# Patient Record
Sex: MALE | Race: ASIAN | URBAN - METROPOLITAN AREA
[De-identification: names, ages, dates, MRNs, and addresses within clinical notes are randomized per-mention and may not be internally consistent; named-entity substitution may affect disease eponyms.]

---

## 2021-03-30 ENCOUNTER — HOSPITAL ENCOUNTER (INPATIENT)
Facility: CLINIC | Age: 41
LOS: 7 days | Discharge: ANOTHER HOSPITAL | End: 2021-04-07
Attending: EMERGENCY MEDICINE | Admitting: PSYCHIATRY & NEUROLOGY
Payer: COMMERCIAL

## 2021-03-30 ENCOUNTER — TELEPHONE (OUTPATIENT)
Dept: BEHAVIORAL HEALTH | Facility: CLINIC | Age: 41
End: 2021-03-30

## 2021-03-30 DIAGNOSIS — E56.9 VITAMIN DEFICIENCY: ICD-10-CM

## 2021-03-30 DIAGNOSIS — F33.3 SEVERE RECURRENT MAJOR DEPRESSIVE DISORDER WITH PSYCHOTIC FEATURES (H): ICD-10-CM

## 2021-03-30 DIAGNOSIS — F51.01 PRIMARY INSOMNIA: ICD-10-CM

## 2021-03-30 DIAGNOSIS — F41.9 ANXIETY: ICD-10-CM

## 2021-03-30 DIAGNOSIS — M10.00 ACUTE IDIOPATHIC GOUT, UNSPECIFIED SITE: Primary | ICD-10-CM

## 2021-03-30 DIAGNOSIS — T71.162A SUICIDE ATTEMPT BY HANGING, INITIAL ENCOUNTER (H): ICD-10-CM

## 2021-03-30 DIAGNOSIS — Z11.52 ENCOUNTER FOR SCREENING LABORATORY TESTING FOR SEVERE ACUTE RESPIRATORY SYNDROME CORONAVIRUS 2 (SARS-COV-2): ICD-10-CM

## 2021-03-30 DIAGNOSIS — G43.019 INTRACTABLE MIGRAINE WITHOUT AURA AND WITHOUT STATUS MIGRAINOSUS: ICD-10-CM

## 2021-03-30 LAB
ALBUMIN SERPL-MCNC: 4.4 G/DL (ref 3.4–5)
ALP SERPL-CCNC: 62 U/L (ref 40–150)
ALT SERPL W P-5'-P-CCNC: 38 U/L (ref 0–70)
AMPHETAMINES UR QL SCN: NEGATIVE
ANION GAP SERPL CALCULATED.3IONS-SCNC: 11 MMOL/L (ref 3–14)
AST SERPL W P-5'-P-CCNC: 18 U/L (ref 0–45)
BARBITURATES UR QL: NEGATIVE
BASOPHILS # BLD AUTO: 0 10E9/L (ref 0–0.2)
BASOPHILS NFR BLD AUTO: 0.6 %
BENZODIAZ UR QL: NEGATIVE
BILIRUB SERPL-MCNC: 0.5 MG/DL (ref 0.2–1.3)
BUN SERPL-MCNC: 14 MG/DL (ref 7–30)
CALCIUM SERPL-MCNC: 9.2 MG/DL (ref 8.5–10.1)
CANNABINOIDS UR QL SCN: NEGATIVE
CHLORIDE SERPL-SCNC: 104 MMOL/L (ref 94–109)
CO2 SERPL-SCNC: 25 MMOL/L (ref 20–32)
COCAINE UR QL: NEGATIVE
CREAT SERPL-MCNC: 0.96 MG/DL (ref 0.66–1.25)
DIFFERENTIAL METHOD BLD: NORMAL
EOSINOPHIL # BLD AUTO: 0.1 10E9/L (ref 0–0.7)
EOSINOPHIL NFR BLD AUTO: 2 %
ERYTHROCYTE [DISTWIDTH] IN BLOOD BY AUTOMATED COUNT: 12.3 % (ref 10–15)
ETHANOL UR QL SCN: NEGATIVE
GFR SERPL CREATININE-BSD FRML MDRD: >90 ML/MIN/{1.73_M2}
GLUCOSE SERPL-MCNC: 108 MG/DL (ref 70–99)
HCT VFR BLD AUTO: 46.2 % (ref 40–53)
HGB BLD-MCNC: 16.3 G/DL (ref 13.3–17.7)
IMM GRANULOCYTES # BLD: 0 10E9/L (ref 0–0.4)
IMM GRANULOCYTES NFR BLD: 0.1 %
LABORATORY COMMENT REPORT: NORMAL
LYMPHOCYTES # BLD AUTO: 2.2 10E9/L (ref 0.8–5.3)
LYMPHOCYTES NFR BLD AUTO: 31 %
MCH RBC QN AUTO: 28.9 PG (ref 26.5–33)
MCHC RBC AUTO-ENTMCNC: 35.3 G/DL (ref 31.5–36.5)
MCV RBC AUTO: 82 FL (ref 78–100)
MONOCYTES # BLD AUTO: 0.3 10E9/L (ref 0–1.3)
MONOCYTES NFR BLD AUTO: 3.9 %
NEUTROPHILS # BLD AUTO: 4.3 10E9/L (ref 1.6–8.3)
NEUTROPHILS NFR BLD AUTO: 62.4 %
NRBC # BLD AUTO: 0 10*3/UL
NRBC BLD AUTO-RTO: 0 /100
OPIATES UR QL SCN: NEGATIVE
PLATELET # BLD AUTO: 216 10E9/L (ref 150–450)
POTASSIUM SERPL-SCNC: 3.4 MMOL/L (ref 3.4–5.3)
PROT SERPL-MCNC: 8.1 G/DL (ref 6.8–8.8)
RBC # BLD AUTO: 5.64 10E12/L (ref 4.4–5.9)
SARS-COV-2 RNA RESP QL NAA+PROBE: NEGATIVE
SODIUM SERPL-SCNC: 140 MMOL/L (ref 133–144)
SPECIMEN SOURCE: NORMAL
TSH SERPL DL<=0.005 MIU/L-ACNC: 1.93 MU/L (ref 0.4–4)
WBC # BLD AUTO: 6.9 10E9/L (ref 4–11)

## 2021-03-30 PROCEDURE — 85025 COMPLETE CBC W/AUTO DIFF WBC: CPT | Performed by: EMERGENCY MEDICINE

## 2021-03-30 PROCEDURE — 80320 DRUG SCREEN QUANTALCOHOLS: CPT | Performed by: EMERGENCY MEDICINE

## 2021-03-30 PROCEDURE — 99285 EMERGENCY DEPT VISIT HI MDM: CPT | Performed by: EMERGENCY MEDICINE

## 2021-03-30 PROCEDURE — 80053 COMPREHEN METABOLIC PANEL: CPT | Performed by: EMERGENCY MEDICINE

## 2021-03-30 PROCEDURE — 87635 SARS-COV-2 COVID-19 AMP PRB: CPT | Performed by: EMERGENCY MEDICINE

## 2021-03-30 PROCEDURE — C9803 HOPD COVID-19 SPEC COLLECT: HCPCS

## 2021-03-30 PROCEDURE — 250N000013 HC RX MED GY IP 250 OP 250 PS 637: Performed by: EMERGENCY MEDICINE

## 2021-03-30 PROCEDURE — 80307 DRUG TEST PRSMV CHEM ANLYZR: CPT | Performed by: EMERGENCY MEDICINE

## 2021-03-30 PROCEDURE — 99285 EMERGENCY DEPT VISIT HI MDM: CPT | Mod: 25

## 2021-03-30 PROCEDURE — 90791 PSYCH DIAGNOSTIC EVALUATION: CPT

## 2021-03-30 PROCEDURE — 84443 ASSAY THYROID STIM HORMONE: CPT | Performed by: EMERGENCY MEDICINE

## 2021-03-30 RX ORDER — ACETAMINOPHEN 500 MG
1000 TABLET ORAL ONCE
Status: COMPLETED | OUTPATIENT
Start: 2021-03-30 | End: 2021-03-30

## 2021-03-30 RX ORDER — DULOXETIN HYDROCHLORIDE 60 MG/1
60 CAPSULE, DELAYED RELEASE ORAL EVERY MORNING
Status: DISCONTINUED | OUTPATIENT
Start: 2021-03-31 | End: 2021-04-07 | Stop reason: HOSPADM

## 2021-03-30 RX ORDER — DULOXETIN HYDROCHLORIDE 60 MG/1
60 CAPSULE, DELAYED RELEASE ORAL EVERY MORNING
Status: ON HOLD | COMMUNITY
Start: 2021-03-13 | End: 2021-04-07

## 2021-03-30 RX ORDER — TRAZODONE HYDROCHLORIDE 100 MG/1
50 TABLET ORAL
Status: ON HOLD | COMMUNITY
Start: 2020-01-24 | End: 2021-04-07

## 2021-03-30 RX ORDER — PROPRANOLOL HYDROCHLORIDE 80 MG/1
80 CAPSULE, EXTENDED RELEASE ORAL DAILY
Status: DISCONTINUED | OUTPATIENT
Start: 2021-03-31 | End: 2021-04-07 | Stop reason: HOSPADM

## 2021-03-30 RX ORDER — TRAZODONE HYDROCHLORIDE 100 MG/1
100 TABLET ORAL AT BEDTIME
Status: DISCONTINUED | OUTPATIENT
Start: 2021-03-30 | End: 2021-03-31

## 2021-03-30 RX ADMIN — ACETAMINOPHEN 1000 MG: 500 TABLET, FILM COATED ORAL at 23:04

## 2021-03-30 ASSESSMENT — ENCOUNTER SYMPTOMS
ARTHRALGIAS: 0
NECK STIFFNESS: 0
HEADACHES: 0
COLOR CHANGE: 0
TROUBLE SWALLOWING: 0
SHORTNESS OF BREATH: 0
NAUSEA: 0
NECK PAIN: 0
SLEEP DISTURBANCE: 0
SORE THROAT: 0
VOMITING: 0
DYSPHORIC MOOD: 1
FEVER: 0
WEAKNESS: 1
BACK PAIN: 1
ABDOMINAL PAIN: 0
COUGH: 0
CONFUSION: 0
EYE REDNESS: 0
DIFFICULTY URINATING: 0
FACIAL SWELLING: 0

## 2021-03-30 NOTE — ED NOTES
Bed: HW01  Expected date: 3/30/21  Expected time: 5:33 PM  Means of arrival: Ambulance  Comments:  [place] [id number]----40 male, SI copoperative

## 2021-03-30 NOTE — TELEPHONE ENCOUNTER
"5:15p Jacquelyn  through [facility] called with colateral on her client.  phone number is [phone]. The pt's wife name is Ingrid Flores contact information is as follows [phone].     COPE called EMS to transport the pt to [facility] ER for SI. The pt attempted suicide last night. The pt attempted to hang himself. The pt has a hx of SI w/ a plan of hanging himself.     The pt identifies a potential trigger to his CM & COPE: the pt's  medical Hx of chronic pain and limited mobility in his leg due to a back injury at work previous and the pt got declined for social security disability roughly last week- the coincides with the pt's increased SI and depression.     The pt's wife stated to the CM that his behaviors have been outside the pt's norm for the last week. The pt has been experiencing increased anger and suicidal statements. The wife stated \"he has been so angry I am afraid to drive him to the ER myself\".     The pt does have a therapist and psychiatrist through Joyce Smith (theripist) & Dr. Ray Sánchez (Psychitrist).     Pt has a medical Hx of chronic pain and limited mobility in his leg due to a back injury at work previous and gout.     Pt has a MH Hx of  MDD & PTSD.     The pt has the following medication Rx: trazodone, propanonol, duloxepine.     "

## 2021-03-30 NOTE — ED PROVIDER NOTES
[facility] EMERGENCY DEPARTMENT ([facility])  3/30/21      History     Chief Complaint   Patient presents with     Suicide Attempt     tried to hang himself yesterday.  Spouse      The history is provided by the patient and medical records.     Kari Flores is a 41 year old male who is to the emergency department for evaluation of suicidal ideation and attempt.  The patient states that he attempted to hang himself yesterday.  He states that he wrapped a rope around his neck and then the door handle of the bathroom door.  He states he sat down and leaned backwards but ultimately never lost consciousness.  He states he ultimately untied himself.  He denies any difficulty breathing or swallowing today.  He states that he subsequently called [place] last night and  was referred to the emergency department by his mental health  today.  Patient denies any recent fall or injury.  He endorses ongoing depression here in the emergency department and is not able to contract for safety.  He denies any recent illness.  No fever, cough, or dyspnea.  Denies abdominal pain.  He does use a cane for chronic back pain and weakness in his left leg subsequent to an injury.  He states that that is stable and unchanged.    Past Medical History  No past medical history on file.  No past surgical history on file.  DULoxetine (CYMBALTA) 60 MG capsule  propranolol SR BEADS (INDREAL XL) 80 MG 24 hr capsule  traZODone (DESYREL) 100 MG tablet      No Known Allergies  Family History  No family history on file.  Social History   Social History     Tobacco Use     Smoking status: Not on file   Substance Use Topics     Alcohol use: Not on file     Drug use: Not on file      Past medical history, past surgical history, medications, allergies, family history, and social history were reviewed with the patient. No additional pertinent items.       Review of Systems   Constitutional: Negative for fever.   HENT: Negative for congestion,  facial swelling, sore throat and trouble swallowing.    Eyes: Negative for redness.   Respiratory: Negative for cough and shortness of breath.    Cardiovascular: Negative for chest pain.   Gastrointestinal: Negative for abdominal pain, nausea and vomiting.   Genitourinary: Negative for difficulty urinating.   Musculoskeletal: Positive for back pain (chronic and unchanged). Negative for arthralgias, neck pain and neck stiffness.   Skin: Negative for color change and rash.   Neurological: Positive for weakness (Left leg-chronic and unchanged). Negative for headaches.   Psychiatric/Behavioral: Positive for dysphoric mood and suicidal ideas. Negative for confusion and sleep disturbance.   All other systems reviewed and are negative.    A complete review of systems was performed with pertinent positives and negatives noted in the HPI, and all other systems negative.    Physical Exam   BP: (!) 136/92  Pulse: 67  Temp: 98.1  F (36.7  C)  Resp: 16  SpO2: 99 %  Physical Exam  Vitals signs and nursing note reviewed.   Constitutional:       General: He is not in acute distress.     Appearance: He is not diaphoretic.   HENT:      Head: Normocephalic and atraumatic.      Mouth/Throat:      Mouth: Mucous membranes are moist.   Eyes:      General: No scleral icterus.     Pupils: Pupils are equal, round, and reactive to light.      Comments: No petechiae   Neck:      Musculoskeletal: Normal range of motion.   Cardiovascular:      Rate and Rhythm: Normal rate and regular rhythm.      Pulses: Normal pulses.      Heart sounds: Normal heart sounds.   Pulmonary:      Effort: Pulmonary effort is normal. No respiratory distress.      Breath sounds: Normal breath sounds.   Chest:      Chest wall: No tenderness.   Abdominal:      General: Bowel sounds are normal.      Palpations: Abdomen is soft.      Tenderness: There is no abdominal tenderness.   Musculoskeletal:         General: No tenderness.      Cervical back: He exhibits no  tenderness.      Thoracic back: He exhibits no tenderness.      Lumbar back: He exhibits decreased range of motion. He exhibits no tenderness.   Skin:     General: Skin is warm.      Findings: No rash.   Neurological:      Mental Status: He is oriented to person, place, and time.      Coordination: Coordination normal.      Gait: Gait abnormal (Steppage pattern left lower extremity).   Psychiatric:         Mood and Affect: Mood is depressed. Affect is blunt.         Speech: Speech is delayed.         Behavior: Behavior is withdrawn. Behavior is cooperative.         Thought Content: Thought content includes suicidal ideation.         ED Course      Procedures        The medical record was reviewed and interpreted.  Current labs reviewed and interpreted.  Managed outpatient prescription medications.   Results for orders placed or performed during the hospital encounter of 03/30/21   Drug abuse screen 6 urine (chem dep)     Status: None   Result Value Ref Range    Amphetamine Qual Urine Negative NEG^Negative    Barbiturates Qual Urine Negative NEG^Negative    Benzodiazepine Qual Urine Negative NEG^Negative    Cannabinoids Qual Urine Negative NEG^Negative    Cocaine Qual Urine Negative NEG^Negative    Ethanol Qual Urine Negative NEG^Negative    Opiates Qualitative Urine Negative NEG^Negative   CBC with platelets differential     Status: None   Result Value Ref Range    WBC 6.9 4.0 - 11.0 10e9/L    RBC Count 5.64 4.4 - 5.9 10e12/L    Hemoglobin 16.3 13.3 - 17.7 g/dL    Hematocrit 46.2 40.0 - 53.0 %    MCV 82 78 - 100 fl    MCH 28.9 26.5 - 33.0 pg    MCHC 35.3 31.5 - 36.5 g/dL    RDW 12.3 10.0 - 15.0 %    Platelet Count 216 150 - 450 10e9/L    Diff Method Automated Method     % Neutrophils 62.4 %    % Lymphocytes 31.0 %    % Monocytes 3.9 %    % Eosinophils 2.0 %    % Basophils 0.6 %    % Immature Granulocytes 0.1 %    Nucleated RBCs 0 0 /100    Absolute Neutrophil 4.3 1.6 - 8.3 10e9/L    Absolute Lymphocytes 2.2 0.8 -  5.3 10e9/L    Absolute Monocytes 0.3 0.0 - 1.3 10e9/L    Absolute Eosinophils 0.1 0.0 - 0.7 10e9/L    Absolute Basophils 0.0 0.0 - 0.2 10e9/L    Abs Immature Granulocytes 0.0 0 - 0.4 10e9/L    Absolute Nucleated RBC 0.0    Comprehensive metabolic panel     Status: Abnormal   Result Value Ref Range    Sodium 140 133 - 144 mmol/L    Potassium 3.4 3.4 - 5.3 mmol/L    Chloride 104 94 - 109 mmol/L    Carbon Dioxide 25 20 - 32 mmol/L    Anion Gap 11 3 - 14 mmol/L    Glucose 108 (H) 70 - 99 mg/dL    Urea Nitrogen 14 7 - 30 mg/dL    Creatinine 0.96 0.66 - 1.25 mg/dL    GFR Estimate >90 >60 mL/min/[1.73_m2]    GFR Estimate If Black >90 >60 mL/min/[1.73_m2]    Calcium 9.2 8.5 - 10.1 mg/dL    Bilirubin Total 0.5 0.2 - 1.3 mg/dL    Albumin 4.4 3.4 - 5.0 g/dL    Protein Total 8.1 6.8 - 8.8 g/dL    Alkaline Phosphatase 62 40 - 150 U/L    ALT 38 0 - 70 U/L    AST 18 0 - 45 U/L   TSH with free T4 reflex     Status: None   Result Value Ref Range    TSH 1.93 0.40 - 4.00 mU/L   Asymptomatic SARS-CoV-2 COVID-19 Virus (Coronavirus) by PCR     Status: None    Specimen: Nasopharyngeal   Result Value Ref Range    SARS-CoV-2 Virus Specimen Source Nasopharyngeal     SARS-CoV-2 PCR Result NEGATIVE     SARS-CoV-2 PCR Comment (Note)         Patient evaluated by DEC .  Patient discussed with DEC consultant.  See note for complete details.      Results for orders placed or performed during the hospital encounter of 03/30/21   Drug abuse screen 6 urine (chem dep)     Status: None   Result Value Ref Range    Amphetamine Qual Urine Negative NEG^Negative    Barbiturates Qual Urine Negative NEG^Negative    Benzodiazepine Qual Urine Negative NEG^Negative    Cannabinoids Qual Urine Negative NEG^Negative    Cocaine Qual Urine Negative NEG^Negative    Ethanol Qual Urine Negative NEG^Negative    Opiates Qualitative Urine Negative NEG^Negative   CBC with platelets differential     Status: None   Result Value Ref Range    WBC 6.9 4.0 - 11.0 10e9/L     RBC Count 5.64 4.4 - 5.9 10e12/L    Hemoglobin 16.3 13.3 - 17.7 g/dL    Hematocrit 46.2 40.0 - 53.0 %    MCV 82 78 - 100 fl    MCH 28.9 26.5 - 33.0 pg    MCHC 35.3 31.5 - 36.5 g/dL    RDW 12.3 10.0 - 15.0 %    Platelet Count 216 150 - 450 10e9/L    Diff Method Automated Method     % Neutrophils 62.4 %    % Lymphocytes 31.0 %    % Monocytes 3.9 %    % Eosinophils 2.0 %    % Basophils 0.6 %    % Immature Granulocytes 0.1 %    Nucleated RBCs 0 0 /100    Absolute Neutrophil 4.3 1.6 - 8.3 10e9/L    Absolute Lymphocytes 2.2 0.8 - 5.3 10e9/L    Absolute Monocytes 0.3 0.0 - 1.3 10e9/L    Absolute Eosinophils 0.1 0.0 - 0.7 10e9/L    Absolute Basophils 0.0 0.0 - 0.2 10e9/L    Abs Immature Granulocytes 0.0 0 - 0.4 10e9/L    Absolute Nucleated RBC 0.0    Comprehensive metabolic panel     Status: Abnormal   Result Value Ref Range    Sodium 140 133 - 144 mmol/L    Potassium 3.4 3.4 - 5.3 mmol/L    Chloride 104 94 - 109 mmol/L    Carbon Dioxide 25 20 - 32 mmol/L    Anion Gap 11 3 - 14 mmol/L    Glucose 108 (H) 70 - 99 mg/dL    Urea Nitrogen 14 7 - 30 mg/dL    Creatinine 0.96 0.66 - 1.25 mg/dL    GFR Estimate >90 >60 mL/min/[1.73_m2]    GFR Estimate If Black >90 >60 mL/min/[1.73_m2]    Calcium 9.2 8.5 - 10.1 mg/dL    Bilirubin Total 0.5 0.2 - 1.3 mg/dL    Albumin 4.4 3.4 - 5.0 g/dL    Protein Total 8.1 6.8 - 8.8 g/dL    Alkaline Phosphatase 62 40 - 150 U/L    ALT 38 0 - 70 U/L    AST 18 0 - 45 U/L   TSH with free T4 reflex     Status: None   Result Value Ref Range    TSH 1.93 0.40 - 4.00 mU/L   Asymptomatic SARS-CoV-2 COVID-19 Virus (Coronavirus) by PCR     Status: None    Specimen: Nasopharyngeal   Result Value Ref Range    SARS-CoV-2 Virus Specimen Source Nasopharyngeal     SARS-CoV-2 PCR Result NEGATIVE     SARS-CoV-2 PCR Comment (Note)      Medications   DULoxetine (CYMBALTA) DR capsule 60 mg (has no administration in time range)   propranolol SR BEADS (INDREAL XL) CP24 80 mg (has no administration in time range)   traZODone  (DESYREL) tablet 100 mg (has no administration in time range)   acetaminophen (TYLENOL) tablet 1,000 mg (1,000 mg Oral Given 3/30/21 7301)        Assessments & Plan (with Medical Decision Making)   41 year old male with history of depression to the emergency department after an attempted hanging yesterday.  He tried to hang himself from a doorknob with a rope that he got off of some draperies in his home.  He is unable to contract for safety here in the emergency department.  He appears significantly depressed and withdrawn.  He has undergone BEC assessment and admission has been recommended.  The patient is voluntary and agreeable with this plan but holdable if he changes his mind.  He does not have any medical concerns.  He does not have any Covid symptoms or known exposures.  The patient appears medically stable for psychiatric admission.    I have reviewed the nursing notes. I have reviewed the findings, diagnosis, plan and need for follow up with the patient.    New Prescriptions    No medications on file       Final diagnoses:   Suicide attempt by hanging, initial encounter (H)     Chart documentation was completed with Dragon voice-recognition software. Even though reviewed, this chart may still contain some grammatical, spelling, and word errors.     --DEJON WILCOX MD, MD     [facility] EMERGENCY DEPARTMENT  3/30/2021     Dejon Wilcox MD  03/30/21 8837

## 2021-03-31 PROBLEM — T71.162A SUICIDE ATTEMPT BY HANGING, INITIAL ENCOUNTER (H): Status: ACTIVE | Noted: 2021-03-31

## 2021-03-31 PROCEDURE — 124N000002 HC R&B MH UMMC

## 2021-03-31 PROCEDURE — 250N000013 HC RX MED GY IP 250 OP 250 PS 637: Performed by: EMERGENCY MEDICINE

## 2021-03-31 RX ORDER — ACETAMINOPHEN 325 MG/1
650 TABLET ORAL EVERY 4 HOURS PRN
Status: DISCONTINUED | OUTPATIENT
Start: 2021-03-31 | End: 2021-04-07 | Stop reason: HOSPADM

## 2021-03-31 RX ORDER — MAGNESIUM HYDROXIDE/ALUMINUM HYDROXICE/SIMETHICONE 120; 1200; 1200 MG/30ML; MG/30ML; MG/30ML
30 SUSPENSION ORAL EVERY 4 HOURS PRN
Status: DISCONTINUED | OUTPATIENT
Start: 2021-03-31 | End: 2021-04-07 | Stop reason: HOSPADM

## 2021-03-31 RX ORDER — TRAZODONE HYDROCHLORIDE 50 MG/1
50 TABLET, FILM COATED ORAL
Status: DISCONTINUED | OUTPATIENT
Start: 2021-03-31 | End: 2021-03-31

## 2021-03-31 RX ORDER — HYDROXYZINE HYDROCHLORIDE 25 MG/1
25 TABLET, FILM COATED ORAL EVERY 4 HOURS PRN
Status: DISCONTINUED | OUTPATIENT
Start: 2021-03-31 | End: 2021-04-07 | Stop reason: HOSPADM

## 2021-03-31 RX ORDER — OLANZAPINE 10 MG/2ML
5-10 INJECTION, POWDER, FOR SOLUTION INTRAMUSCULAR 3 TIMES DAILY PRN
Status: DISCONTINUED | OUTPATIENT
Start: 2021-03-31 | End: 2021-04-07 | Stop reason: HOSPADM

## 2021-03-31 RX ORDER — OLANZAPINE 5 MG/1
5-10 TABLET ORAL 3 TIMES DAILY PRN
Status: DISCONTINUED | OUTPATIENT
Start: 2021-03-31 | End: 2021-04-07 | Stop reason: HOSPADM

## 2021-03-31 RX ORDER — LANOLIN ALCOHOL/MO/W.PET/CERES
3 CREAM (GRAM) TOPICAL
Status: DISCONTINUED | OUTPATIENT
Start: 2021-03-31 | End: 2021-04-07 | Stop reason: HOSPADM

## 2021-03-31 RX ADMIN — PROPRANOLOL HYDROCHLORIDE 80 MG: 80 CAPSULE, EXTENDED RELEASE ORAL at 07:55

## 2021-03-31 RX ADMIN — DULOXETINE HYDROCHLORIDE 60 MG: 60 CAPSULE, DELAYED RELEASE ORAL at 07:55

## 2021-03-31 ASSESSMENT — ACTIVITIES OF DAILY LIVING (ADL)
DRESS: INDEPENDENT
HYGIENE/GROOMING: INDEPENDENT
LAUNDRY: WITH SUPERVISION
ORAL_HYGIENE: INDEPENDENT

## 2021-03-31 ASSESSMENT — MIFFLIN-ST. JEOR: SCORE: 1575.33

## 2021-03-31 NOTE — PLAN OF CARE
"            Work Completed: [facility] reviewed pt's chart and DEC assessment to gather collateral information. [facility] met with pt to introduce themselves and explain their role on the treatment team. [facility] tried to engage pt about the IPA interview, however pt was not able to constructively participate. Pt responded to most questions with basic yes and no responses, or \"I don't know\" or \"I am not sure\". Pt was cooperative and appropriate in his responses, but presented as very depressed and possibly had a hard time processing what [facility] was saying. Pt declined wanting or needing an  when asked/offered. Pt did request to be shown how to flush the toilet in the room, and [facility] showed him the button on the side that engaged the flushing system. Pt declined needing [facility] to contact anyone about his current stay/placement. [facility] started IPA via chart review and will attempt to interview pt again tomorrow to complete.     Discharge plan or goal: TBD upon evaluation and assessment by treatment team.                Barriers to discharge: Safe discharge plan, ongoing symptoms severity (active SI and Depression), and medication evaluation.    "

## 2021-03-31 NOTE — ED NOTES
Patient was able to get out from bed indeopendently using his quad cane. Steady on feet when ambulating, able to go to bathroom, washed his  hands by himself and went back to room independently.
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
## 2021-03-31 NOTE — TELEPHONE ENCOUNTER
S: Pt is a 41 yrs old male in the [place] ED for post SA, reports by William at 7:38PM.     B:  Pt had a SA yesterday by trying to hang himself with a rope.  Pt reports he has been suicidal for the past 2-3 weeks. He has lots of trauma and medical concerns.  Pt had a SA 2 years ago. Family had safety proof the house some what and removed all ropes.  Pt used the rope that ties the window curtain.  Pt reports no substance use. No HI.  Pt describes that he has audio hallucinations.  At night he hears voices; he hears a person yelling and thinks the person will come to his house to harm him.   stated that it is trauma related.  Pt has a hx of SIB by hitting self and pulling his hair.  Pt is depressed and withdrawn.  He cannot provide details of what his meds are.  Said he is compliant.  Has services in place (, psychiatrist, and therapist).  Pt lives with family. Pt was declined for SSA last week which led to his depression and SI.  He has a leg and back injury from work and uses cane to walk.    Pt has no symptoms of COVID.  He is medically cleared and ambulates with a cane.    CBC: normal  COVID: pending  UTOX: pending  CMP: Glucose 108  Vitals: stable    A: Vol.  Holdable.  Pt is actively suicidal.  Cannot contract for safety.  Said that it is best he dies and thinks hanging self is best.      R: 9:35PM- Dr. Phan accepts for 10/Glenn.        Patient cleared and ready for behavioral bed placement: Yes

## 2021-03-31 NOTE — PROGRESS NOTES
.               IN PATIENT ROOM:   Pt has glasses in his room.    IN PATIENT LOCKER:   Pt has a cane, black pants with strings, grey shorts, white shirt, grey sweatshirt, crocs, and a [place] 's license are in the locker. Pt does not have a cell phone or wallet at this time.    IN SECURITY:   Pt has nothing in security time.    ADMISSION:  I am responsible for any personal items that are not sent to the safe or pharmacy. [facility] is not responsible for loss, theft or damage of any property in my possession.    Patient Signature _____________________ Date/Time _____________________    Staff Signature _______________________ Date/Time _____________________  2nd Staff person, if patient is unable/unwilling to sign  ___________________________________ Date/Time _____________________  DISCHARGE:  All personal items have been returned to me.    Patient Signature _____________________ Date/Time _____________________    Staff Signature _______________________ Date/Time _____________________
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
## 2021-03-31 NOTE — ED NOTES
Patient is boarding in ER.  Given new linens.  Given new scrubs and clothing items.  Given items to distract.  Engaged in conversation with patient.

## 2021-03-31 NOTE — ED NOTES
[facility] [place] ED Mental Health Handoff Note:       Brief HPI:  This is a 41 year old male signed out to me by Dr. Salguero.  See initial ED Provider note for full details of the presentation. Interval history is pertinent for suicide attempt by hanging, no signs trauma. Holdable, voluntary now.    Home meds reviewed and ordered/administered: Yes    Medically stable for inpatient mental health admission: Yes.    Evaluated by mental health: Yes. The recommendation is for inpatient mental health treatment. Bed search in process    Safety concerns: At the time I received sign out, there were no safety concerns.    Hold Status:  Active Orders   N/A            Exam:   Patient Vitals for the past 24 hrs:   BP Temp Temp src Pulse Resp SpO2   03/30/21 1745 (!) 136/92 98.1  F (36.7  C) Oral 67 16 99 %           ED Course:    Medications   DULoxetine (CYMBALTA) DR capsule 60 mg (has no administration in time range)   propranolol SR BEADS (INDREAL XL) CP24 80 mg (has no administration in time range)   traZODone (DESYREL) tablet 100 mg (100 mg Oral Not Given 3/31/21 0020)   acetaminophen (TYLENOL) tablet 1,000 mg (1,000 mg Oral Given 3/30/21 2304)            There were no significant events during my shift.    Patient was signed out to the oncoming provider      Impression:    ICD-10-CM    1. Suicide attempt by hanging, initial encounter (H)  T71.162A Drug abuse screen 6 urine (chem dep)     CBC with platelets differential     Comprehensive metabolic panel     TSH with free T4 reflex     Asymptomatic SARS-CoV-2 COVID-19 Virus (Coronavirus) by PCR       Plan:    1. Awaiting inpatient mental health admission/transfer.      RESULTS:   Results for orders placed or performed during the hospital encounter of 03/30/21 (from the past 24 hour(s))   CBC with platelets differential     Status: None    Collection Time: 03/30/21  8:37 PM   Result Value Ref Range    WBC 6.9 4.0 - 11.0 10e9/L    RBC Count 5.64 4.4 - 5.9 10e12/L    Hemoglobin  16.3 13.3 - 17.7 g/dL    Hematocrit 46.2 40.0 - 53.0 %    MCV 82 78 - 100 fl    MCH 28.9 26.5 - 33.0 pg    MCHC 35.3 31.5 - 36.5 g/dL    RDW 12.3 10.0 - 15.0 %    Platelet Count 216 150 - 450 10e9/L    Diff Method Automated Method     % Neutrophils 62.4 %    % Lymphocytes 31.0 %    % Monocytes 3.9 %    % Eosinophils 2.0 %    % Basophils 0.6 %    % Immature Granulocytes 0.1 %    Nucleated RBCs 0 0 /100    Absolute Neutrophil 4.3 1.6 - 8.3 10e9/L    Absolute Lymphocytes 2.2 0.8 - 5.3 10e9/L    Absolute Monocytes 0.3 0.0 - 1.3 10e9/L    Absolute Eosinophils 0.1 0.0 - 0.7 10e9/L    Absolute Basophils 0.0 0.0 - 0.2 10e9/L    Abs Immature Granulocytes 0.0 0 - 0.4 10e9/L    Absolute Nucleated RBC 0.0    Comprehensive metabolic panel     Status: Abnormal    Collection Time: 03/30/21  8:37 PM   Result Value Ref Range    Sodium 140 133 - 144 mmol/L    Potassium 3.4 3.4 - 5.3 mmol/L    Chloride 104 94 - 109 mmol/L    Carbon Dioxide 25 20 - 32 mmol/L    Anion Gap 11 3 - 14 mmol/L    Glucose 108 (H) 70 - 99 mg/dL    Urea Nitrogen 14 7 - 30 mg/dL    Creatinine 0.96 0.66 - 1.25 mg/dL    GFR Estimate >90 >60 mL/min/[1.73_m2]    GFR Estimate If Black >90 >60 mL/min/[1.73_m2]    Calcium 9.2 8.5 - 10.1 mg/dL    Bilirubin Total 0.5 0.2 - 1.3 mg/dL    Albumin 4.4 3.4 - 5.0 g/dL    Protein Total 8.1 6.8 - 8.8 g/dL    Alkaline Phosphatase 62 40 - 150 U/L    ALT 38 0 - 70 U/L    AST 18 0 - 45 U/L   TSH with free T4 reflex     Status: None    Collection Time: 03/30/21  8:37 PM   Result Value Ref Range    TSH 1.93 0.40 - 4.00 mU/L   Asymptomatic SARS-CoV-2 COVID-19 Virus (Coronavirus) by PCR     Status: None    Collection Time: 03/30/21  9:03 PM    Specimen: Nasopharyngeal   Result Value Ref Range    SARS-CoV-2 Virus Specimen Source Nasopharyngeal     SARS-CoV-2 PCR Result NEGATIVE     SARS-CoV-2 PCR Comment (Note)    Drug abuse screen 6 urine (chem dep)     Status: None    Collection Time: 03/30/21  9:51 PM   Result Value Ref Range     Amphetamine Qual Urine Negative NEG^Negative    Barbiturates Qual Urine Negative NEG^Negative    Benzodiazepine Qual Urine Negative NEG^Negative    Cannabinoids Qual Urine Negative NEG^Negative    Cocaine Qual Urine Negative NEG^Negative    Ethanol Qual Urine Negative NEG^Negative    Opiates Qualitative Urine Negative NEG^Negative             MD Elie Stein, Nj Gutierrez MD  03/31/21 0117

## 2021-03-31 NOTE — PROGRESS NOTES
Kari Flores is reviewed for [facility] service. Will follow and meet with patient/family/care team as able or requested.     Spoke with Leilani @ Central Intake. Patient accepted to station 10.   Intake presented to station 10 on 03/30 and patient was originally declined because it was believed that patient needed assistance ambulating and with ADL's. Per chart review, intake called station 10 charge and updated that patient is independent with all ADL's and does not need assistance with transfers, repositioning or toileting. He does use a can but is independent with this use.     Kia Samson, [facility], [facility]   [phone]

## 2021-03-31 NOTE — ED NOTES
.  ED to Behavioral Floor Handoff    SITUATION  Kari Flores is a 41 year old male who speaks English and lives in a home with a spouse The patient arrived in the ED by ambulance from home with a complaint of Suicide Attempt (tried to hang himself yesterday.  Spouse )  .The patient's current symptoms started/worsened 1 month(s) ago and during this time the symptoms have increased.   In the ED, pt was diagnosed with   Final diagnoses:   Suicide attempt by hanging, initial encounter (H)        Initial vitals were: BP: (!) 136/92  Pulse: 67  Temp: 98.1  F (36.7  C)  Resp: 16  SpO2: 99 %   --------  Is the patient diabetic? No   If yes, last blood glucose? --     If yes, was this treated in the ED? --  --------  Is the patient inebriated (ETOH) No or Impaired on other substances? No  MSSA done? N/A  Last MSSA score: --    Were withdrawal symptoms treated? N/A  Does the patient have a seizure history? No. If yes, date of most recent seizure--  --------  Is the patient patient experiencing suicidal ideation? has a history of suicidal attempts, most recent yesterday he tried to hang himself and today he got another rope to try again    Homicidal ideation? denies current or recent homicidal ideation or behaviors.    Self-injurious behavior/urges? denies current or recent self injurious behavior or ideation.  ------  Was pt aggressive in the ED No  Was a code called No  Is the pt now cooperative? Yes  -------  Meds given in ED: Medications - No data to display   Family present during ED course? No  Family currently present? No  Daughter is Radha and her ph is [phone] and she helps with interpreting for her mom.  Ingrid Flores, spouse, contact information is as follows [phone].        BACKGROUND  Does the patient have a cognitive impairment or developmental disability? No  Allergies: No Known Allergies.   Social demographics are   Social History     Socioeconomic History     Marital status:      Spouse name: Not  on file     Number of children: Not on file     Years of education: Not on file     Highest education level: Not on file   Occupational History     Not on file   Social Needs     Financial resource strain: Not on file     Food insecurity     Worry: Not on file     Inability: Not on file     Transportation needs     Medical: Not on file     Non-medical: Not on file   Tobacco Use     Smoking status: Not on file   Substance and Sexual Activity     Alcohol use: Not on file     Drug use: Not on file     Sexual activity: Not on file   Lifestyle     Physical activity     Days per week: Not on file     Minutes per session: Not on file     Stress: Not on file   Relationships     Social connections     Talks on phone: Not on file     Gets together: Not on file     Attends Cheondo[employer] service: Not on file     Active member of club or organization: Not on file     Attends meetings of clubs or organizations: Not on file     Relationship status: Not on file     Intimate partner violence     Fear of current or ex partner: Not on file     Emotionally abused: Not on file     Physically abused: Not on file     Forced sexual activity: Not on file   Other Topics Concern     Not on file   Social History Narrative     Not on file        ASSESSMENT  Labs results   Labs Ordered and Resulted from Time of ED Arrival Up to the Time of Departure from the ED   COMPREHENSIVE METABOLIC PANEL - Abnormal; Notable for the following components:       Result Value    Glucose 108 (*)     All other components within normal limits   CBC WITH PLATELETS DIFFERENTIAL   TSH WITH FREE T4 REFLEX   SARS-COV-2 (COVID-19) VIRUS RT-PCR   DRUG ABUSE SCREEN 6 CHEM DEP URINE ([facility])      Imaging Studies: No results found for this or any previous visit (from the past 24 hour(s)).   Most recent vital signs BP (!) 136/92   Pulse 67   Temp 98.1  F (36.7  C) (Oral)   Resp 16   SpO2 99%    Abnormal labs/tests/findings requiring intervention:---   Pain control:  good  Nausea control: pt had none    RECOMMENDATION  Are any infection precautions needed (MRSA, VRE, etc.)? No If yes, what infection? --  ---  Does the patient have mobility issues? with one assist. If yes, what device does the pt use?  QUAD CANE---  -Patient needs assist with repositioning in bed  -SBA with transfers  -and showering  -Assist with dressing  -Needs grab bar for toileting   -Needs help with wiping after BM  Doesn't like [employer] - okay with eggs, rice, chicken tenders, warm water    Patient watched admit Video.    Please call his wife when he gets admitted to let her know unit and phone number.    Is patient on 72 hour hold or commitment? No If on 72 hour hold, have hold and rights been given to patient? N/A  Are admitting orders written if after 10 p.m. ?N/A  Tasks needing to be completed:---     Lissette Peña RN   [facility]--    [phone] [place] [facility]   [phone] [facility]
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
## 2021-04-01 PROCEDURE — 124N000002 HC R&B MH UMMC

## 2021-04-01 PROCEDURE — 99222 1ST HOSP IP/OBS MODERATE 55: CPT | Mod: AI | Performed by: PSYCHIATRY & NEUROLOGY

## 2021-04-01 PROCEDURE — 250N000013 HC RX MED GY IP 250 OP 250 PS 637: Performed by: PSYCHIATRY & NEUROLOGY

## 2021-04-01 PROCEDURE — 250N000013 HC RX MED GY IP 250 OP 250 PS 637: Performed by: EMERGENCY MEDICINE

## 2021-04-01 RX ORDER — FEBUXOSTAT 80 MG/1
80 TABLET, FILM COATED ORAL DAILY PRN
Status: ON HOLD | COMMUNITY
End: 2021-04-07

## 2021-04-01 RX ORDER — ERGOCALCIFEROL 1.25 MG/1
50000 CAPSULE, LIQUID FILLED ORAL
Status: ON HOLD | COMMUNITY
End: 2021-04-07

## 2021-04-01 RX ORDER — COLCHICINE 0.6 MG/1
0.6 TABLET ORAL 2 TIMES DAILY PRN
Status: ON HOLD | COMMUNITY
End: 2021-04-07

## 2021-04-01 RX ORDER — FEBUXOSTAT 40 MG/1
80 TABLET, FILM COATED ORAL DAILY PRN
Status: DISCONTINUED | OUTPATIENT
Start: 2021-04-01 | End: 2021-04-07 | Stop reason: HOSPADM

## 2021-04-01 RX ORDER — PROPRANOLOL HYDROCHLORIDE 80 MG/1
80 CAPSULE, EXTENDED RELEASE ORAL DAILY
Status: ON HOLD | COMMUNITY
End: 2021-04-07

## 2021-04-01 RX ORDER — COLCHICINE 0.6 MG/1
0.6 TABLET ORAL 2 TIMES DAILY PRN
Status: DISCONTINUED | OUTPATIENT
Start: 2021-04-01 | End: 2021-04-07 | Stop reason: HOSPADM

## 2021-04-01 RX ORDER — ERGOCALCIFEROL 1.25 MG/1
50000 CAPSULE, LIQUID FILLED ORAL
Status: DISCONTINUED | OUTPATIENT
Start: 2021-04-01 | End: 2021-04-07 | Stop reason: HOSPADM

## 2021-04-01 RX ORDER — SUMATRIPTAN 25 MG/1
50 TABLET, FILM COATED ORAL
Status: DISCONTINUED | OUTPATIENT
Start: 2021-04-01 | End: 2021-04-07 | Stop reason: HOSPADM

## 2021-04-01 RX ORDER — SUMATRIPTAN 50 MG/1
50 TABLET, FILM COATED ORAL
Status: ON HOLD | COMMUNITY
End: 2021-04-07

## 2021-04-01 RX ORDER — BUPROPION HYDROCHLORIDE 150 MG/1
150 TABLET ORAL DAILY
Status: DISCONTINUED | OUTPATIENT
Start: 2021-04-01 | End: 2021-04-05

## 2021-04-01 RX ORDER — PREDNISONE 5 MG/1
TABLET ORAL
Status: ON HOLD | COMMUNITY
End: 2021-04-07

## 2021-04-01 RX ORDER — PREDNISONE 5 MG/1
5 TABLET ORAL
Status: DISCONTINUED | OUTPATIENT
Start: 2021-04-01 | End: 2021-04-01 | Stop reason: CLARIF

## 2021-04-01 RX ORDER — PROPRANOLOL HYDROCHLORIDE 80 MG/1
80 CAPSULE, EXTENDED RELEASE ORAL DAILY
Status: DISCONTINUED | OUTPATIENT
Start: 2021-04-01 | End: 2021-04-01

## 2021-04-01 RX ADMIN — PROPRANOLOL HYDROCHLORIDE 80 MG: 80 CAPSULE, EXTENDED RELEASE ORAL at 09:00

## 2021-04-01 RX ADMIN — DULOXETINE HYDROCHLORIDE 60 MG: 60 CAPSULE, DELAYED RELEASE ORAL at 09:00

## 2021-04-01 RX ADMIN — ERGOCALCIFEROL 50000 UNITS: 1.25 CAPSULE, LIQUID FILLED ORAL at 14:50

## 2021-04-01 RX ADMIN — OLANZAPINE 5 MG: 5 TABLET, FILM COATED ORAL at 09:03

## 2021-04-01 RX ADMIN — BUPROPION HYDROCHLORIDE 150 MG: 150 TABLET, FILM COATED, EXTENDED RELEASE ORAL at 14:50

## 2021-04-01 ASSESSMENT — ACTIVITIES OF DAILY LIVING (ADL)
HYGIENE/GROOMING: INDEPENDENT
DRESS: INDEPENDENT
LAUNDRY: WITH SUPERVISION
LAUNDRY: WITH SUPERVISION
DRESS: INDEPENDENT
ORAL_HYGIENE: INDEPENDENT
HYGIENE/GROOMING: INDEPENDENT
ORAL_HYGIENE: INDEPENDENT

## 2021-04-01 NOTE — PHARMACY-ADMISSION MEDICATION HISTORY
Admission Medication History Completed by Pharmacy    See [facility] Admission Navigator for allergy information, preferred outpatient pharmacy, prior to admission medications and immunization status.     Medication history sources:  patient interview via phone, Care Everywhere    Changes made to PTA medication list (reason)  Added:   - Uloric, colchicine, prednisone, vitamin D, sumatriptan  Deleted: N/A  Changed: N/A    Additional medication history information:   - Care Everywhere records show Uloric and Colcrys prescribed to take scheduled. However, patient reports taking them PRN only when he has gout flares. Prescribed prednisone instructions indicate directions to taper of 6 weeks, but patient described usually tapering off of prednisone over ~7 days when he takes it.  - Patient denies taking any additional Rx/OTC medications other than the ones listed below.    Prior to Admission medications    Medication Sig Last Dose Taking? Auth Provider   colchicine (COLCYRS) 0.6 MG tablet Take 0.6 mg by mouth 2 times daily as needed (gout flares) 3/29/2021 Yes Unknown, Entered By History   DULoxetine (CYMBALTA) 60 MG capsule 60 mg every morning  3/29/2021 at Unknown time Yes Reported, Patient   febuxostat (ULORIC) 80 MG TABS tablet Take 80 mg by mouth daily as needed (gout flares)  3/29/2021 Yes Unknown, Entered By History   predniSONE (DELTASONE) 5 MG tablet Take as directed for gout flares  Yes Unknown, Entered By History   propranolol ER (INDERAL LA) 80 MG 24 hr capsule Take 80 mg by mouth daily  Yes Unknown, Entered By History   SUMAtriptan (IMITREX) 50 MG tablet Take 50 mg by mouth at onset of headache for migraine PRN Yes Unknown, Entered By History   traZODone (DESYREL) 100 MG tablet Take 50 mg by mouth nightly as needed for sleep  3/29/2021 at Unknown time Yes Reported, Patient   vitamin D2 (ERGOCALCIFEROL) 62859 units (1250 mcg) capsule Take 50,000 Units by mouth in the morning, Mon and Thur Until 4/8/21 - then take  OTC supplement 1 tablet by mouth daily 3/29/2021 Yes Unknown, Entered By History     Date completed: 04/01/21    Medication history completed by:   Matt Bull, PharmD, BCPS  [facility]: Ascom *[id number]

## 2021-04-01 NOTE — PLAN OF CARE
"Problem: Suicidal Behavior  Goal: Suicidal Behavior is Absent or Managed  Outcome: Improving  Flowsheets (Taken 3/31/2021 2144)  Mutually Determined Action Steps (Suicidal Behavior Absent/Managed): verbalizes safety check rationale  Patient was mostly isolative and withdrawn to his room; he briefly came out and sat in the lounge for about 30 minutes but interacted with no one. He presented with a flat, blunted, and sad affect; endorsed depression and chronic suicidal thoughts; responded \"don't know\" when writer inquired about anxiety, hallucinations, and racing thoughts. He ate about 50% of his dinner, had no scheduled medication and requested no PRN. No safety, behavioral, or other concerns reported or observed.  "

## 2021-04-01 NOTE — PROGRESS NOTES
04/01/21 0630   Sleep/Rest/Relaxation   Sleep/Rest/Relaxation appears asleep   Night Time # Hours 7 hours     Pt had a quiet night. He was observed sleeping soundly. He did not request any prn medications. No behavior or safety concerns was noted.

## 2021-04-01 NOTE — PLAN OF CARE
Work Completed: [facility] reviewed pt's chart and DEC assessment to gather collateral information. [facility] completed intial team note. [facility] met with pt to complete IPA and updated as necessary. Pt was less guarded and appeared more organized today then yesterday. Pt continues to present as flat and depressed when interacting with [facility]. Pt signed JARETH's for his providers, which were placed in pt's chart, and pt requested help with SSDI. [facility] explained that they couldn't do anything themselves, but would reach out to pt's CM to try to address this concern. [facility] called pt's CM, Jacquelyn [phone], leaving  requesting a callback to discuss pt's SSDI situation and resources. [facility] spoke to CM, Jacquelyn, to get some collateral info and was informed that pt is working with a legal team on his SSDI, but his most recent application was denied. The legal team will continue to work with pt to apply again. [facility] provided update on pt's presentation.      Discharge plan or goal: Pt will likely be discharged back home with outpatient follow-up and referral to trauma therapy if agreeable.                 Barriers to discharge: Safe discharge plan, ongoing symptoms severity (active SI and Depression), and medication evaluation.

## 2021-04-01 NOTE — PLAN OF CARE
BEHAVIORAL TEAM DISCUSSION    Participants: Dr. Franklyn MONTES, Sharyn Lizarraga RN, Osvaldo Tracey [facility]  Progress: New Admit  Anticipated length of stay: 7 days  Continued Stay Criteria/Rationale: Evaluation and assessment  Medical/Physical: Pt has chronic back and leg pain that requires a walker, which has been provided to pt. Pt is ambulatory with this assistance device.  Precautions:   Behavioral Orders   Procedures    Code 1 - Restrict to Unit    Routine Programming     As clinically indicated    Status 15     Every 15 minutes.    Suicide precautions     Patients on Suicide Precautions should have a Combination Diet ordered that includes a Diet selection(s) AND a Behavioral Tray selection for Safe Tray - with utensils, or Safe Tray - NO utensils       Plan: Continue evaluation and assessment for stabilization and aftercare needs.  Rationale for change in precautions or plan: None.

## 2021-04-01 NOTE — PLAN OF CARE
Problem: OT General Care Plan  Goal: OT Goal 1  Description: Will attend OT groups and participate actively in all OT opportunities. Will assess and set goals.    Pt has not attended scheduled occupational therapy sessions. Encourage attendance and participation. Pt will be given self-assessment form, and OT staff will explain the purpose of including them in their treatment plan and offer options for meeting their needs.
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
## 2021-04-01 NOTE — PLAN OF CARE
"Nursing Assessment    Recent Vitals: B/P: 125/95, T: 98.6, P: 66, R: 16     Sleep:  Hours of sleep at night: 7  Barriers: None    General Shift Summary  Patient has primarily been in room for the shift. He had only the banana bread from his breakfast tray. Patient voiced feeling depressed and suicidal. He stated that he wanted a pill to sleep and not wake up. Patient was informed that we did not provide things like this and that we treat patient's. He stated \"That is why I came here, for the pill\". He was cooperative with morning medications. He appeared to be tracking and distracted, Zyprexa 5mg PO was given. Later patient came out into the dining room and ate most of his lunch. There seemed to be a slight improvement from yesterday in concerns to his mood. Hygiene is fair.    Plan is to continue to monitor patient status q 15 mins, assess response to medications, and maintain the patients safety.    Sharyn Lizarraga RN MSN  "

## 2021-04-02 PROCEDURE — 250N000013 HC RX MED GY IP 250 OP 250 PS 637: Performed by: PSYCHIATRY & NEUROLOGY

## 2021-04-02 PROCEDURE — 124N000002 HC R&B MH UMMC

## 2021-04-02 PROCEDURE — 250N000013 HC RX MED GY IP 250 OP 250 PS 637: Performed by: EMERGENCY MEDICINE

## 2021-04-02 PROCEDURE — 99232 SBSQ HOSP IP/OBS MODERATE 35: CPT | Performed by: PSYCHIATRY & NEUROLOGY

## 2021-04-02 RX ORDER — OLANZAPINE 5 MG/1
5 TABLET ORAL 2 TIMES DAILY
Status: DISCONTINUED | OUTPATIENT
Start: 2021-04-02 | End: 2021-04-07 | Stop reason: HOSPADM

## 2021-04-02 RX ADMIN — OLANZAPINE 5 MG: 5 TABLET, FILM COATED ORAL at 19:17

## 2021-04-02 RX ADMIN — DULOXETINE HYDROCHLORIDE 60 MG: 60 CAPSULE, DELAYED RELEASE ORAL at 09:11

## 2021-04-02 RX ADMIN — OLANZAPINE 10 MG: 5 TABLET, FILM COATED ORAL at 09:11

## 2021-04-02 RX ADMIN — BUPROPION HYDROCHLORIDE 150 MG: 150 TABLET, FILM COATED, EXTENDED RELEASE ORAL at 09:10

## 2021-04-02 RX ADMIN — PROPRANOLOL HYDROCHLORIDE 80 MG: 80 CAPSULE, EXTENDED RELEASE ORAL at 09:10

## 2021-04-02 ASSESSMENT — ACTIVITIES OF DAILY LIVING (ADL)
DRESS: INDEPENDENT
ORAL_HYGIENE: INDEPENDENT
HYGIENE/GROOMING: INDEPENDENT
LAUNDRY: WITH SUPERVISION

## 2021-04-02 NOTE — PLAN OF CARE
"Patient was isolative to his room. He presented with a flat affect, slow to respond. Patient denied voices at this time. He endorses depression, anxiety, and wishing to be dead.He denied SI,SIB, and HI at this time. Patient stated, \" At times I get thoughts of hanging myself, but I wouldn't do it here\". Patient contracted for safety or would let staff know if thoughts worsen. Vital signs were stable, denied pain. He refused dinner, stating \" I'm just not hungry\". Patient started on Zyprexa tonight, he took that no problem. He was encouraged to come out of his room but declined.   "

## 2021-04-02 NOTE — PLAN OF CARE
"Patient was isolative to his room. He had a calm approach, flat affect. Patient denied depression, anxiety,SI,SIB,HI. Contracts for safety while in the hospital. He endorses Auditory hallucinations, patient stating \" I hear people laughing at me and it makes me feel scared.\" Patient was encouraged to take medication but refused. He was informed of his available PRN medications if symptoms worsen. Patient ate dinner and slept most of the shift.    "

## 2021-04-02 NOTE — PLAN OF CARE
Work Completed: MARTHA (writer) met with trx team, provided update, and reviewed pt's chart. MARTHA spoke with pt's therapist, Joyce Boles, from the [facility] who called [facility] today. MARTHA provided update and answered questions she had about pt's current admit. Therapist reported that she had not seen any psychotic presentation while working with pt and that pt had never reported any hallucinations to her. Therapist believes that most of pt's issues are wrapped up in his trauma history and said she would speak to her supervisor about accessibility to a trauma specialist at [facility]. MARTHA met with pt to check in with him, and confirm that they spoke with his CM about the SSDI situation. MARTHA informed pt that the legal team still wants to work with him to apply again and that the unit number was given to the CM so they can reach out to pt. MARTHA also said that the CM will potentially give the number to pt's  so they can call pt while he is hospitalized. Pt declined having any other questions or concerns at this time and will check in with MARTHA next Monday.     Discharge plan or goal: Pt will be discharged home with outpatient follow-up appointments. Subject to change based on pt's presentation.                Barriers to discharge: Safe discharge plan, ongoing assessment of symptoms, medication evaluation, and symptom endorsement (MDD and PTSD).

## 2021-04-02 NOTE — PROGRESS NOTES
"  PSYCHIATRIC PROGRESS NOTE    The patient was seen for support, his chart reviewed, his case discussed with staff.  Observation on the santos reveals the patient to be very quiet, withdrawn, isolative and markedly depressed. He has been medication compliant. He slept well without taking anything for sleep.    This is the first [facility] psychiatric admission and the first psychiatric admission ever for this 41 year old  Hmong male with a long history of PTSD, depression, traumatic brain injury and a history of previous suicide attempt who was brought to our ED via ambulance after his family  discovered him laying on the floor in his garage after he tried to hang himself. He never lost consciousness.  He was transferred to Station 10 where he was seen by Dr. Estes who added Wellbutrin  mg QAM to his preadmission medications consisting of Cymbalta 60 mg QAM and PRN Trazodone.  He seems to tolerate Wellbutrin well, without any side effects but so far noticed no difference in the way he feels.  He continues to be depressed and now tells me that he hears \"voices\" within his head from time to time telling him \"bad things\", laughing at him and suggesting to kill himself.  It should be noted that his father killed himself through hanging when he was a little boy in [facility]. He denies any other history of mental illness in his family.    VS: Temp - 97.4, Pulse - 58, Resp - 16, SpO2 - 95%, BP - 105/69.    MENTAL STATUS EXAMINATION  Reveals a normally built and normally developed 41 year old  male appearing his stated age. He ambulates slowly with a walker. He is alert and oriented X 3. His speech is scarce with low tempo and tone. He appears to be markedly depressed with psychomotor retardation. He admits to fleeting suicidal thoughts. He reluctantly admits to auditory hallucinations of negative content. No overt delusions are noted or elicited. He has only marginal insight into his problems and has a " poor judgement.    DIAGNOSTIC IMPRESSION    Major Depression, recurrent with psychotic features  PTSD, unresolved    Plan: Continue close observation. I will continue Cymbalta and Wellbutrin XL in the same doses. I will add Zyprexa 5 mg BID on a scheduled basis. I will discontinue Trazodone.    Sammy Phan MD

## 2021-04-03 PROCEDURE — 250N000013 HC RX MED GY IP 250 OP 250 PS 637: Performed by: PSYCHIATRY & NEUROLOGY

## 2021-04-03 PROCEDURE — 250N000013 HC RX MED GY IP 250 OP 250 PS 637: Performed by: EMERGENCY MEDICINE

## 2021-04-03 PROCEDURE — 124N000002 HC R&B MH UMMC

## 2021-04-03 RX ADMIN — PROPRANOLOL HYDROCHLORIDE 80 MG: 80 CAPSULE, EXTENDED RELEASE ORAL at 09:20

## 2021-04-03 RX ADMIN — OLANZAPINE 5 MG: 5 TABLET, FILM COATED ORAL at 09:20

## 2021-04-03 RX ADMIN — BUPROPION HYDROCHLORIDE 150 MG: 150 TABLET, FILM COATED, EXTENDED RELEASE ORAL at 09:19

## 2021-04-03 RX ADMIN — DULOXETINE HYDROCHLORIDE 60 MG: 60 CAPSULE, DELAYED RELEASE ORAL at 09:20

## 2021-04-03 RX ADMIN — OLANZAPINE 5 MG: 5 TABLET, FILM COATED ORAL at 19:41

## 2021-04-03 ASSESSMENT — ACTIVITIES OF DAILY LIVING (ADL)
ORAL_HYGIENE: INDEPENDENT
LAUNDRY: UNABLE TO COMPLETE
ORAL_HYGIENE: INDEPENDENT
DRESS: SCRUBS (BEHAVIORAL HEALTH)
HYGIENE/GROOMING: INDEPENDENT
DRESS: SCRUBS (BEHAVIORAL HEALTH)
HYGIENE/GROOMING: INDEPENDENT
LAUNDRY: UNABLE TO COMPLETE

## 2021-04-03 NOTE — PLAN OF CARE
Problem: Suicidal Behavior  Goal: Suicidal Behavior is Absent or Managed  Outcome: No Change  Note: Pt slept through majority of shift with no complaints or concerns.     NOC Shift Report    Pt in bed at beginning of shift, breathing quiet and unlabored. Pt slept through shift. Pt slept 7 hours.     No pt complaints or concerns at this time.     No PRNs given. Will continue to monitor.     Precautions: Suicide

## 2021-04-03 NOTE — PLAN OF CARE
Patient stayed in room most of the time this shift, sleeping.  Flat affect, denies SI/SIB.  Rated depression/anxiety 8/10.  Patient claimed feel safe here in the unit.  No aggressive behavior noted.  Will continue to monitor closely.

## 2021-04-03 NOTE — PLAN OF CARE
"Patient was calm and isolative to his room. He presented with a flat affect. He endorses depression, anxiety, AVH, and SI. Patient rated depression and anxiety at a 4/10. Patient stated, \" I wish I was dead, sometimes I have thoughts of suicide but there is nothing here to hang myself with, so I won't do it here.\" Patient stated, \" how did I get here, all I remember is the ambulance ride. I remember feeling scare. I still get scared\" Patient asked why he gets scared, he stated, \" Sometimes I see shadows of a person that walks around my room and I hear people laughing. I'm not sure what they say though.\" Patient was encouraged to tell the nurse to get medication when this occurs. He denied SIB and HI. Contracts for safety in the hospital. Vital signs were stable, pain denies. The patient feels like his medications are working, he stated, \" It's putting my in better mood.\" The patient was encouraged to take a shower but refused. He refused dinner tonight, he stated, \" I just don't want to eat, I just want to take a pill and never wake up. I will eat tomorrow.\" Patient contracted for safety at this time. The patient would like to get resources to get back on social security.   "

## 2021-04-04 PROCEDURE — 124N000002 HC R&B MH UMMC

## 2021-04-04 PROCEDURE — 250N000013 HC RX MED GY IP 250 OP 250 PS 637: Performed by: EMERGENCY MEDICINE

## 2021-04-04 PROCEDURE — 250N000013 HC RX MED GY IP 250 OP 250 PS 637: Performed by: PSYCHIATRY & NEUROLOGY

## 2021-04-04 RX ADMIN — DULOXETINE HYDROCHLORIDE 60 MG: 60 CAPSULE, DELAYED RELEASE ORAL at 09:28

## 2021-04-04 RX ADMIN — OLANZAPINE 5 MG: 5 TABLET, FILM COATED ORAL at 19:38

## 2021-04-04 RX ADMIN — BUPROPION HYDROCHLORIDE 150 MG: 150 TABLET, FILM COATED, EXTENDED RELEASE ORAL at 09:29

## 2021-04-04 RX ADMIN — OLANZAPINE 5 MG: 5 TABLET, FILM COATED ORAL at 09:29

## 2021-04-04 RX ADMIN — PROPRANOLOL HYDROCHLORIDE 80 MG: 80 CAPSULE, EXTENDED RELEASE ORAL at 09:29

## 2021-04-04 ASSESSMENT — ACTIVITIES OF DAILY LIVING (ADL)
DRESS: SCRUBS (BEHAVIORAL HEALTH)
LAUNDRY: UNABLE TO COMPLETE
HYGIENE/GROOMING: INDEPENDENT
ORAL_HYGIENE: INDEPENDENT

## 2021-04-04 NOTE — PLAN OF CARE
Problem: Suicidal Behavior  Goal: Suicidal Behavior is Absent or Managed  Outcome: No Change  Note: Pt slept through shift with no concerns.     NOC Shift Report    Pt in bed at beginning of shift, breathing quiet and unlabored. Pt slept through shift. Pt slept 7 hours.     No pt complaints or concerns at this time.     No PRNs given. Will continue to monitor.     Precautions: Suicide

## 2021-04-04 NOTE — PLAN OF CARE
"Patient appeared very depressed today. He said he is hearing voices and does not know if they are any better. Patient said he has suicidal thoughts. He said he would not attempt anything in the hospital because \"I don't have my rope\". Apparently, patient came into the hospital with a rope. Patient said he didn't eat lunch to this writer, but according to psych associates, he ate lunch in the dining area. Patient declined prn medications for voices or anxiety. Patient said his gout was acting up and his back hurt, however, declined interventions. \"It's not to that point yet.\" Patient has been isolative and withdrawn this shift. He said his sleep is better. He appears untidy with neglected hygiene. Will continue to monitor and offer support.  Blood pressure 118/86, pulse 50, temperature 98.4  F (36.9  C), temperature source Oral, resp. rate 16, height 1.651 m (5' 5\"), weight 74.3 kg (163 lb 14.4 oz), SpO2 98 %.   "

## 2021-04-05 PROCEDURE — 124N000002 HC R&B MH UMMC

## 2021-04-05 PROCEDURE — 99233 SBSQ HOSP IP/OBS HIGH 50: CPT | Performed by: PSYCHIATRY & NEUROLOGY

## 2021-04-05 PROCEDURE — 250N000013 HC RX MED GY IP 250 OP 250 PS 637: Performed by: EMERGENCY MEDICINE

## 2021-04-05 PROCEDURE — 250N000013 HC RX MED GY IP 250 OP 250 PS 637: Performed by: PSYCHIATRY & NEUROLOGY

## 2021-04-05 RX ORDER — BUPROPION HYDROCHLORIDE 300 MG/1
300 TABLET ORAL DAILY
Status: DISCONTINUED | OUTPATIENT
Start: 2021-04-06 | End: 2021-04-07 | Stop reason: HOSPADM

## 2021-04-05 RX ADMIN — OLANZAPINE 5 MG: 5 TABLET, FILM COATED ORAL at 20:24

## 2021-04-05 RX ADMIN — OLANZAPINE 5 MG: 5 TABLET, FILM COATED ORAL at 08:45

## 2021-04-05 RX ADMIN — ERGOCALCIFEROL 50000 UNITS: 1.25 CAPSULE, LIQUID FILLED ORAL at 08:45

## 2021-04-05 RX ADMIN — BUPROPION HYDROCHLORIDE 150 MG: 150 TABLET, FILM COATED, EXTENDED RELEASE ORAL at 08:45

## 2021-04-05 RX ADMIN — PROPRANOLOL HYDROCHLORIDE 80 MG: 80 CAPSULE, EXTENDED RELEASE ORAL at 08:45

## 2021-04-05 RX ADMIN — ACETAMINOPHEN 650 MG: 325 TABLET, FILM COATED ORAL at 17:05

## 2021-04-05 RX ADMIN — ACETAMINOPHEN 650 MG: 325 TABLET, FILM COATED ORAL at 08:45

## 2021-04-05 RX ADMIN — DULOXETINE HYDROCHLORIDE 60 MG: 60 CAPSULE, DELAYED RELEASE ORAL at 08:45

## 2021-04-05 ASSESSMENT — ACTIVITIES OF DAILY LIVING (ADL)
LAUNDRY: WITH SUPERVISION
HYGIENE/GROOMING: INDEPENDENT
DRESS: SCRUBS (BEHAVIORAL HEALTH);INDEPENDENT
ORAL_HYGIENE: WITH ASSISTANCE
ORAL_HYGIENE: INDEPENDENT
DRESS: SCRUBS (BEHAVIORAL HEALTH)
HYGIENE/GROOMING: INDEPENDENT

## 2021-04-05 NOTE — PROGRESS NOTES
"[facility], [place]   Psychiatric Progress Note        Interim History:   The patient's care was discussed with the treatment team during the daily team meeting and/or staff's chart notes were reviewed.  Staff report patient spends most of the time in his room, minimal interactions with other patients on the floor and staff members. Passively takes meds and denies having side effects. Still presents as very flat and depressed.    Met with patient: he was seen in his room. He didn't recognize myself and PA student although though us only few days ago during his admission to station 10. Reported that he still felt very depressed, reported Auditory hallucinations: \"voices are laughing at me\". He, at the same time, said that Auditory hallucinations are better?! Reported he still has Suicidal ideation: \"I wish I were dead\". We discussed ECT as an alternative way of treating depression. Kari didn't seem to be able to grasp what was offered to him and had difficulties expressing his opinion on whether he wanted to do it or not. With his permission I talked first to his wife and, then, to his daughter Radha. Typical risks and benefits of ECT were discussed with patient's family, I encouraged them to make an internet search and provided with names of [facility] and [employer] sites and asked them to get back to us.          Medications:       [START ON 4/6/2021] buPROPion  300 mg Oral Daily     DULoxetine  60 mg Oral QAM     OLANZapine  5 mg Oral BID     propranolol ER  80 mg Oral Daily     vitamin D2  50,000 Units Oral Q Mon Thurs AM          Allergies:   No Known Allergies       Labs:   No results found for this or any previous visit (from the past 24 hour(s)).       Psychiatric Examination:     BP (!) 142/91   Pulse 64   Temp 97.9  F (36.6  C) (Oral)   Resp 16   Ht 1.651 m (5' 5\")   Wt 74.3 kg (163 lb 14.4 oz)   SpO2 97%   BMI 27.27 kg/m    Weight is 163 lbs 14.4 oz  Body mass index is " 27.27 kg/m .     Orthostatic Vitals       Most Recent      Sitting Orthostatic /101 04/05 0839    Sitting Orthostatic Pulse (bpm) 55 04/05 0839    Standing Orthostatic /87 04/05 0839    Standing Orthostatic Pulse (bpm) 61 04/05 0839          Appearance: awake, alert, dressed in hospital scrubs and appeared older than stated age  Attitude:  somewhat cooperative  Eye Contact:  fair  Mood:  anxious and depressed  Affect:  intensity is blunted  Speech:  decreased prosody and paucity of speech  Psychomotor Behavior:  physical retardation  Throught Process:  linear  Associations:  no loose associations  Thought Content:  auditory hallucinations present, Suicidal ideation present  Insight:  limited  Judgement:  limited  Oriented to: person, and place  Attention Span and Concentration: poor  Recent and Remote Memory:  limited    Clinical Global Impressions  First: 7/4 3/27     Most recent: 7/4 4/5/2021             Precautions:     Behavioral Orders   Procedures     Code 1 - Restrict to Unit     Routine Programming     As clinically indicated     Status 15     Every 15 minutes.     Suicide precautions     Patients on Suicide Precautions should have a Combination Diet ordered that includes a Diet selection(s) AND a Behavioral Tray selection for Safe Tray - with utensils, or Safe Tray - NO utensils            DIagnoses:     Major Depression, recurrent with psychotic features  PTSD, unresolved         Plan:     Will increase Wellbutrin XL dose. I talked to family about ECT in hopes to help them to make an informed decision about doing it. Will continue to provide support and structure.

## 2021-04-05 NOTE — PLAN OF CARE
"  Problem: Suicidal Behavior  Goal: Suicidal Behavior is Absent or Managed  Outcome: No Change    Patient was calm and cooperative, Isolative to his room. He appears disorganized and confused. He thought he was in a clinic, didn't know the date or time. Patient endorses depression and anxiety ( 8/10). He stated, \"Why am I like this.\" Patient wishes he was dead but SI,SIB,HI. He contracts for safety while in the hospital. He denied auditory and visual hallucinations at this time. Patient stated, \" They come and go. It's still shadows and laughing.\" Patient showered this evening. He refused dinner and snack. He is drinking fluids. All vitals were stable besides heart rate, it was 48 bpm this evening resting, recheck was 64 bpm. He denied pain.      "

## 2021-04-05 NOTE — PLAN OF CARE
Problem: Suicidal Behavior  Goal: Suicidal Behavior is Absent or Managed  Outcome: No Change    NOC Shift Report    Pt in bed at beginning of shift, breathing quiet and unlabored. Pt slept through shift. Pt slept 7 hours.     No pt complaints or concerns at this time.     No PRNs given. Will continue to monitor.     Precautions: Suicide

## 2021-04-05 NOTE — PLAN OF CARE
"Pt has been isolative and withdrawn to room most of the shift. Pt does come out but is not social with peers. Declined breakfast and ate 100% of dinner. Pt has been drinking water all shift and requested and received some hot water as well. Affect is flat. Pt uses a walker with a steady slow gait. Pt declined to go to group and also was unable to name anything he needed. Writer offered coloring, reading and assorted word searches and finds and he politely declined. Writer asked what he does at home and he said his son gave him an animal picture book he looks at and also stares out the window. Writer went to the library and got an animal picture book for pt and he was appreciative.  When asked about depression and anxiety pt was unable to rate stating \"I don't know\" for both. Has visual hallucinations of black statue like figures, did not see them last night and feels he slept much better because of it. Has SI  thoughts with no plan and contracts for safety. He wishes he was dead. \"A little\" to being hopeful. Pt had back pain this am and PRN tylenol was given with some relief. Offered egg crate and gout medication and pt declined. States side effects was being tired.    "

## 2021-04-05 NOTE — PLAN OF CARE
Problem: Adult Inpatient Plan of Care  Goal: Plan of Care Review  Outcome: No Change  Flowsheets (Taken 4/5/2021 1806)  Plan of Care Reviewed With: patient  Progress: no change  Goal: Absence of Hospital-Acquired Illness or Injury  Intervention: Prevent Skin Injury  Recent Flowsheet Documentation  Taken 4/5/2021 1759 by Neha Covington RN  Body Position: position changed independently     Problem: Behavioral Health Plan of Care  Goal: Plan of Care Review  Outcome: Improving  Flowsheets (Taken 4/5/2021 1806)  Plan of Care Reviewed With: patient  Progress: no change  Patient Agreement with Plan of Care: agrees     Patient is still isolative and withdrawn, resting in bed majority of the shift. Not attending groups. Flat and blunted affect. Poor concentration. Limited judgement and insight. He was out for snacks, supper meal and to take medications. Ate 75 % of his food then went back to bed resting. Prompt him to take his night time medications. Compliant with taking them. Side effects of meds is a little drowsiness.  Fluid intake encouraged. Rated depression 5/10, anxiety 4/10. Denied suicidal thoughts and hallucinations this time. Complained of back pain. PRN Tylenol 650 mg was given with some relief. He was asking when he will be his possible discharge. Explained to him about the treatment process and encourage to further verbalize his concern with his doctor. Will continue to monitor and assess pt.

## 2021-04-05 NOTE — PLAN OF CARE
Work Completed: MARTHA (writer) met with trx team, provided update, and reviewed pt's chart. MARTHA called pt's CM, Jacquelyn [phone], leaving a VM requesting a callback about pt SSDI application plan and to offer any assistance CTC could provide. MARTHA met with pt to check in and address any concerns/questions he had. Pt inquired about his SSDI application and if CTC could help with that again. MARTHA again explained that was something pt's CM would need to help with, but CTC would assisst if they could and had reached out to CM today already about it. Pt reported feeling better today, confirmed that his is still having hallucinations but they are better. Pt confirmed having some memory issues historically, thinking mostly long term related. During the conversation pt asked the same question three times throughout the conversation, but did no appear to realize he was doing it. Pt was cooperative and pleasant, being observed in the milieu more and less isolative. CTC received a callback from CM, who reported that their was no change at this time. Pt's  will reach out to him to restart the SSDI application process, but there is no rush this week. CM was able to confirm that pt has some memory issues, mostly around retaining details and repeating questions or statements during a conversation. Furthermore, she confirmed that pt had discussed having A/V hallucinations in the past. However, determining if this was trauma based (ie. PTSD) or something more psychosis-like has been an ongoing question. CM said pt is welcome to call her anytime to discuss the SSDI situation with her directly as well. MARTHA met with pt to let them know that CTC and CM connected and updated him. [facility] provided pt a copy of CM contact info.     Discharge plan or goal: Pt will be discharged home with outpatient follow-up appointments. Subject to change based on pt's presentation.                 Barriers to discharge: Safe discharge plan,  ongoing assessment of symptoms, medication evaluation, and symptom endorsement (MDD and PTSD).

## 2021-04-06 PROCEDURE — 99232 SBSQ HOSP IP/OBS MODERATE 35: CPT | Performed by: PSYCHIATRY & NEUROLOGY

## 2021-04-06 PROCEDURE — 250N000013 HC RX MED GY IP 250 OP 250 PS 637: Performed by: EMERGENCY MEDICINE

## 2021-04-06 PROCEDURE — 250N000013 HC RX MED GY IP 250 OP 250 PS 637: Performed by: PSYCHIATRY & NEUROLOGY

## 2021-04-06 PROCEDURE — 124N000002 HC R&B MH UMMC

## 2021-04-06 RX ADMIN — OLANZAPINE 5 MG: 5 TABLET, FILM COATED ORAL at 19:55

## 2021-04-06 RX ADMIN — BUPROPION HYDROCHLORIDE 300 MG: 300 TABLET, FILM COATED, EXTENDED RELEASE ORAL at 08:41

## 2021-04-06 RX ADMIN — SUMATRIPTAN SUCCINATE 50 MG: 25 TABLET, FILM COATED ORAL at 19:55

## 2021-04-06 RX ADMIN — ACETAMINOPHEN 650 MG: 325 TABLET, FILM COATED ORAL at 16:31

## 2021-04-06 RX ADMIN — OLANZAPINE 5 MG: 5 TABLET, FILM COATED ORAL at 08:41

## 2021-04-06 RX ADMIN — DULOXETINE HYDROCHLORIDE 60 MG: 60 CAPSULE, DELAYED RELEASE ORAL at 08:41

## 2021-04-06 RX ADMIN — PROPRANOLOL HYDROCHLORIDE 80 MG: 80 CAPSULE, EXTENDED RELEASE ORAL at 08:40

## 2021-04-06 ASSESSMENT — ACTIVITIES OF DAILY LIVING (ADL)
DIFFICULTY_EATING/SWALLOWING: NO
DRESS: SCRUBS (BEHAVIORAL HEALTH);INDEPENDENT
HYGIENE/GROOMING: INDEPENDENT
DRESSING/BATHING_DIFFICULTY: YES
WALKING_OR_CLIMBING_STAIRS_DIFFICULTY: YES
VISION_MANAGEMENT: GLASSES
DIFFICULTY_COMMUNICATING: NO
DOING_ERRANDS_INDEPENDENTLY_DIFFICULTY: YES
WALKING_OR_CLIMBING_STAIRS: AMBULATION DIFFICULTY, REQUIRES EQUIPMENT;STAIR CLIMBING DIFFICULTY, ASSISTANCE 1 PERSON
CONCENTRATING,_REMEMBERING_OR_MAKING_DECISIONS_DIFFICULTY: YES
ORAL_HYGIENE: INDEPENDENT
DRESSING/BATHING: DRESSING DIFFICULTY, REQUIRES EQUIPMENT
TOILETING_ISSUES: NO
WEAR_GLASSES_OR_BLIND: YES
LAUNDRY: WITH SUPERVISION

## 2021-04-06 ASSESSMENT — MIFFLIN-ST. JEOR: SCORE: 1547.2

## 2021-04-06 NOTE — PLAN OF CARE
BEHAVIORAL TEAM DISCUSSION    Participants: Dr. Franklyn MONTES, Vera Yeung RN, Osvaldo Tracey [facility], Outlier Team  Progress: Minimal improvement. Pt has endorsed a decrease in symptoms, but continues to present with a very depressed affect. Pt is less isolative with encouragement. Pt denies SI when asked sometimes, but continues to endorse auditory and visual hallucinations consistently. Staff continue to have concerns about pt being able to discharge safely given his current presentation, and the seriousness of the SA that brought pt into the hospital. Pt is receptive to medication changes and appears to have improved since changes have been made.   Anticipated length of stay: 7 more days  Continued Stay Criteria/Rationale: Ongoing symptom presentation, medication evaluation, and evaluation for ECT.  Medical/Physical: Nothing new reported.   Precautions:   Behavioral Orders   Procedures    Code 1 - Restrict to Unit    Routine Programming     As clinically indicated    Status 15     Every 15 minutes.    Suicide precautions     Patients on Suicide Precautions should have a Combination Diet ordered that includes a Diet selection(s) AND a Behavioral Tray selection for Safe Tray - with utensils, or Safe Tray - NO utensils       Plan: Pt is being provided medication evaluation and changes to reduce pt's acute symptoms. Pt will discharge back to home once stabilized with continued follow-up with established providers in the community. Pt may be evaluated for ECT, but likely will not be undergoing this treatment at this time given pt's family resistant and pt's uncertainty about it.   Rationale for change in precautions or plan: Ongoing stabilization and aftercare support efforts.

## 2021-04-06 NOTE — PLAN OF CARE
Work Completed: MARTHA (writer) met with trx team, provided update, and reviewed pt's chart. CTC completed team note. CTC met with pt to check in and discuss discharge planning. Pt reports feeling much better today, and appeared less depressed when talking with CTC today. Pt denied SI and A/V hallucinations. Pt reports really liking his new medication and wants to return home on them. Pt is open to Trauma therapy referral if his insurance will cover it. Pt wants to discharge this week, and is hoping to possibly tomorrow.      Discharge plan or goal: Pt will be discharged home with outpatient follow-up appointments. Subject to change based on pt's presentation.                 Barriers to discharge: Safe discharge plan, ongoing assessment of symptoms, medication evaluation, and symptom endorsement (MDD and PTSD).

## 2021-04-06 NOTE — PLAN OF CARE
Problem: Adult Inpatient Plan of Care  Goal: Plan of Care Review  Recent Flowsheet Documentation  Taken 4/6/2021 1647 by Neha Covington RN  Plan of Care Reviewed With: patient     Problem: Behavioral Health Plan of Care  Goal: Plan of Care Review  Outcome: No Change  Flowsheets (Taken 4/6/2021 1647)  Plan of Care Reviewed With: patient  Patient Agreement with Plan of Care: agrees  Goal: Adheres to Safety Considerations for Self and Others  Intervention: Develop and Maintain Individualized Safety Plan  Recent Flowsheet Documentation  Taken 4/6/2021 1638 by Neha Covington RN  Safety Measures: safety rounds completed  Goal: Absence of New-Onset Illness or Injury  Intervention: Identify and Manage Fall Risk  Recent Flowsheet Documentation  Taken 4/6/2021 1638 by Neha Covington RN  Safety Measures: safety rounds completed     Patient is out in the milieu, observed him eating his afternoon snack. Still having a flat and blunted affect. Poor concentration. Stated he has low back and bilateral leg pain, rated 7/10. He was given PRN Tylenol 650 mg at 1631. Pt talked about his being ready to discharge back to his home and he wanted his medications sent to [facility] pharmacy in [place]. Reassurance given to pt. He denied having SI and auditory visual hallucinations this time. Stated that the new medication is working since his hallucinations were lessened. He rated his depression 3/10 and anxiety 4/10. He said his appetite is getting better. And his goal is to eat 3 times a day. He ate at least 50% at supper. Slept well last night. Compliant with taking his medications. Side effects of meds is a little drowsiness. Vital signs WDL. Encouraged fluid intake. He had been watching TV before bed time. Will continue to monitor and assess pt.

## 2021-04-06 NOTE — PROGRESS NOTES
[facility], [place]   Psychiatric Progress Note        Interim History:     The patient's care was discussed with the treatment team during the daily team meeting and/or staff's chart notes were reviewed.  Staff report patient spends most of the time in his room, has not been attending groups and has had minimal interactions with other patients on the floor and staff members. Passively takes meds and denies having side effects. Still presents as flat and depressed with poor insight, however, was reported to eat better. He has been asking to discharge this morning. Documented to have slept about 7 hours. Staff discussed with patient's wife who would like the patient to be discharged as soon as possible.     Met with patient:in the presence of PA student.  He was noted to be out in the common area reading a newspaper. He is more engaged during interview today, and inquires about when he will be able to be discharged. Had discussion with patient about whether he is actually ready to discharge and patient is agreeable to staying a couple more days until he is feeling more stable. He reports that he slept better, and did not experience auditory or visual hallucinations last night. Denies any suicidal ideation or thoughts of self-harm today, reports feeling safe. When asked further about suicidal ideation, states he was experiencing that yesterday but not today. We again discussed ECT as an alternative way of treating depression, and clarified it is his decision whether he would like to do ECT. He did not express his opinion on whether he wanted to do it or not. Typical risks and benefits of ECT were discussed with patient's family on 4/5, and they were encouraged to do some research to help them make an informed decision.  Yesterday, his dose of Wellbutrin XL was increased to 300 mg daily and he is tolerating this well with no adverse effects. He reports he attended a group this morning, and  "he was encouraged to continue attending groups and not isolate in his room.          Medications:   No new medication changes.    buPROPion  300 mg Oral Daily     DULoxetine  60 mg Oral QAM     OLANZapine  5 mg Oral BID     propranolol ER  80 mg Oral Daily     vitamin D2  50,000 Units Oral Q Mon Thurs AM          Allergies:   No Known Allergies       Labs:   No results found for this or any previous visit (from the past 24 hour(s)).       Psychiatric Examination:     /80   Pulse 54   Temp 98.2  F (36.8  C) (Oral)   Resp 16   Ht 1.651 m (5' 5\")   Wt 71.5 kg (157 lb 11.2 oz)   SpO2 97%   BMI 26.24 kg/m    Weight is 157 lbs 11.2 oz  Body mass index is 26.24 kg/m .     Orthostatic Vitals       Most Recent      Sitting Orthostatic /84 04/06 0727    Sitting Orthostatic Pulse (bpm) 59 04/06 0727    Standing Orthostatic /89 04/06 0727    Standing Orthostatic Pulse (bpm) 65 04/06 0727         Appearance: awake, alert, dressed in hospital scrubs and appeared older than stated age  Attitude:  somewhat cooperative, more engaged  Eye Contact:  fair  Mood:  anxious and depressed  Affect:  flat and intensity is blunted  Speech:  decreased prosody and paucity of speech  Psychomotor Behavior:  physical retardation  Throught Process:  linear  Associations:  no loose associations  Thought Content:  no evidence of suicidal ideation or homicidal ideation and no auditory hallucinations present; though suspect he may still be experiencing some level of suicidal ideation and auditory hallucinations  Insight:  limited, but improving?  Judgement:  limited, but improving?  Oriented to: person, and place  Attention Span and Concentration: poor, but better  Recent and Remote Memory:  limited    Clinical Global Impressions  First: 7/4 3/27     Most recent: 7/4 4/5/2021             Precautions:     Behavioral Orders   Procedures     Code 1 - Restrict to Unit     Routine Programming     As clinically indicated     Status " 15     Every 15 minutes.     Suicide precautions     Patients on Suicide Precautions should have a Combination Diet ordered that includes a Diet selection(s) AND a Behavioral Tray selection for Safe Tray - with utensils, or Safe Tray - NO utensils            DIagnoses:     Major Depression, recurrent with psychotic features  PTSD, unresolved         Plan:      Encouraged patient to stay for at least a couple more days while we work to get him more stable, and patient is agreeable. Again discussed ECT with the patient today, and spoke with family yesterday in hopes to help them to make an informed decision about ECT. Will continue to provide support and structure. No medication changes today.

## 2021-04-07 VITALS
TEMPERATURE: 97.7 F | BODY MASS INDEX: 26.27 KG/M2 | DIASTOLIC BLOOD PRESSURE: 79 MMHG | WEIGHT: 157.7 LBS | OXYGEN SATURATION: 98 % | SYSTOLIC BLOOD PRESSURE: 116 MMHG | HEART RATE: 57 BPM | RESPIRATION RATE: 16 BRPM | HEIGHT: 65 IN

## 2021-04-07 PROCEDURE — 250N000013 HC RX MED GY IP 250 OP 250 PS 637: Performed by: PSYCHIATRY & NEUROLOGY

## 2021-04-07 PROCEDURE — 99239 HOSP IP/OBS DSCHRG MGMT >30: CPT | Performed by: PSYCHIATRY & NEUROLOGY

## 2021-04-07 PROCEDURE — 250N000013 HC RX MED GY IP 250 OP 250 PS 637: Performed by: EMERGENCY MEDICINE

## 2021-04-07 RX ORDER — OLANZAPINE 5 MG/1
5 TABLET ORAL 2 TIMES DAILY
Qty: 60 TABLET | Refills: 1 | Status: SHIPPED | OUTPATIENT
Start: 2021-04-07

## 2021-04-07 RX ORDER — FEBUXOSTAT 80 MG/1
80 TABLET, FILM COATED ORAL DAILY PRN
Qty: 30 TABLET | Refills: 0 | Status: SHIPPED | OUTPATIENT
Start: 2021-04-07

## 2021-04-07 RX ORDER — SUMATRIPTAN 50 MG/1
50 TABLET, FILM COATED ORAL
Qty: 30 TABLET | Refills: 0 | Status: SHIPPED | OUTPATIENT
Start: 2021-04-07

## 2021-04-07 RX ORDER — ERGOCALCIFEROL 1.25 MG/1
50000 CAPSULE, LIQUID FILLED ORAL
Qty: 30 CAPSULE | Refills: 0 | Status: SHIPPED | OUTPATIENT
Start: 2021-04-08

## 2021-04-07 RX ORDER — BUPROPION HYDROCHLORIDE 300 MG/1
300 TABLET ORAL DAILY
Qty: 30 TABLET | Refills: 1 | Status: SHIPPED | OUTPATIENT
Start: 2021-04-08

## 2021-04-07 RX ORDER — DULOXETIN HYDROCHLORIDE 60 MG/1
60 CAPSULE, DELAYED RELEASE ORAL EVERY MORNING
Qty: 30 CAPSULE | Refills: 1 | Status: SHIPPED | OUTPATIENT
Start: 2021-04-07

## 2021-04-07 RX ORDER — LANOLIN ALCOHOL/MO/W.PET/CERES
3 CREAM (GRAM) TOPICAL
Qty: 30 TABLET | Refills: 1 | Status: SHIPPED | OUTPATIENT
Start: 2021-04-07

## 2021-04-07 RX ORDER — COLCHICINE 0.6 MG/1
0.6 TABLET ORAL 2 TIMES DAILY PRN
Qty: 60 TABLET | Refills: 0 | Status: SHIPPED | OUTPATIENT
Start: 2021-04-07

## 2021-04-07 RX ORDER — HYDROXYZINE HYDROCHLORIDE 25 MG/1
25 TABLET, FILM COATED ORAL EVERY 4 HOURS PRN
Qty: 45 TABLET | Refills: 1 | Status: SHIPPED | OUTPATIENT
Start: 2021-04-07

## 2021-04-07 RX ADMIN — PROPRANOLOL HYDROCHLORIDE 80 MG: 80 CAPSULE, EXTENDED RELEASE ORAL at 08:15

## 2021-04-07 RX ADMIN — BUPROPION HYDROCHLORIDE 300 MG: 300 TABLET, FILM COATED, EXTENDED RELEASE ORAL at 08:15

## 2021-04-07 RX ADMIN — DULOXETINE HYDROCHLORIDE 60 MG: 60 CAPSULE, DELAYED RELEASE ORAL at 08:15

## 2021-04-07 RX ADMIN — OLANZAPINE 5 MG: 5 TABLET, FILM COATED ORAL at 08:15

## 2021-04-07 NOTE — PROVIDER NOTIFICATION
04/06/21 1951   Malnutrition Screening Tool (MST)   Have you recently lost weight without trying? 2   Have you been eating poorly because of a decreased appetite? 1

## 2021-04-07 NOTE — PLAN OF CARE
Work Completed: MARTHA (writer) met with trx team, provided update, and reviewed pt's chart. [facility] arranged outpatient follow-up appointments and med-ride for pt to get home. CTC met with pt who reported he was discharging today and reported a reduction in symptoms, denying MH symptoms when asked. CTC completed AVS and reviewed it with pt. CTC called [facility] [place] to discuss referring pt to a Trauma specialist, which they said they would work with pt on after discharge. CTC called , leaving , updating them about discharge plan and requesting a callback to provide their fax for the AVS. [facility] did not hear back from  by end of day. [facility] faxed JARETH's to [employer] as per their request.      Discharge plan or goal: Pt will be discharged home with outpatient follow-up appointments. Subject to change based on pt's presentation.                 Barriers to discharge: None

## 2021-04-07 NOTE — PLAN OF CARE
48 Hours Nursing Assessment/Discharge note  Shift Summary: Pt alert and oriented x 3. Presents polite, cooperative and calm. Able to communicate needs. Speech is clear and coherent  Affect is flat and mood is calm . Poor concentrations. Insight and judgement are intact.  Hopeful for future. Visible in milieu and social with other patient. Appetite adequate. Pt is medication compliance. Slept 7.0 hours last night.     Discharged today. All discharge medications and instructions were reviewed with pt. Copy of discharge instruction and unit address/phone number given to pt. Discharge medications were given to pt to take home. Walking, escorted down stairs and transferred to car safely.   At the time of discharge, pt denied any SI, SIB, HI, hallucination, racing thought, suicidal or homicidal ideations. No evidence of psychosis or paranoid/delusional thoughts. Endorses both depression and anxiety. Rated depression 5/10 & anxiety 3/10.   Discharge medication: [facility] pharmacy # [id number]  Discharge palace: home  Transportation: cab  Outcome: progressing well   New Medications Today: none  Prn Medication given/Reason/effectiveness: none  Pain: denies   Sensitivity/side effect: tolerating medications well. No side effect reported by pt or noted by this writer.

## 2021-04-07 NOTE — DISCHARGE INSTRUCTIONS
Behavioral Discharge Planning and Instructions    Summary: You were admitted on 3/30/2021  due to Depression, Suicidal Ideations and Suicide Attempt.  You were treated by Dr. Franklyn MONTES, and discharged on 04/07/2021 from [facility] to Home    Main Diagnosis:   Major Depression, recurrent with psychotic features  PTSD, unresolved    Health Care Follow-up:   Therapy Appointment - [facility] [place]  Date/Time: 04/20/21 at 1:00pm this is a Telehealth Visit  Provider: Joyce Boles  Address: [address] [place] [address]  Phone: [phone]  Fax: [phone]  This is a telehealth visit, and they will send you a link to connect with your provider like last time. Please call them if you have any questions or concerns.     Psychiatry/Med-Management Appointment  Date/Time: 4/19/21 at 9:30am and again on 5/10/21 at 9:30am. These are Telehealth Visit.  Provider: Dr. Sánchez  Address: [address] [place] [address]  Phone: [phone]  Fax: [phone]  This is a telehealth visit, and they will send you a link to connect with your provider like last time. Please call them if you have any questions or concerns.      - Marly  Provider: Jacquelyn  Phone: [phone]    [facility] is a community resource meant to help people recovery from difficult times. This may be a good resource for you if you feel you need somewhere to go.  [place] [address] [address]  [phone]    Attend all scheduled appointments with your outpatient providers. Call at least 24 hours in advance if you need to reschedule an appointment to ensure continued access to your outpatient providers.     Major Treatments, Procedures and Findings:  You were provided with: a psychiatric assessment, assessed for medical stability, medication evaluation and/or management, group therapy and milieu management    Symptoms to Report: feeling more aggressive, increased confusion,  "losing more sleep, mood getting worse or thoughts of suicide    Early warning signs can include: increased depression or anxiety sleep disturbances increased thoughts or behaviors of suicide or self-harm     Safety and Wellness:  Take all medicines as directed.  Make no changes unless your doctor suggests them.      Follow treatment recommendations.  Refrain from alcohol and non-prescribed drugs.  If there is a concern for safety, call 911.    Resources:   Crisis Intervention: 657.848.6060 or 648-444-0861 (TTY: 696.222.9863).  Call anytime for help.  National [place] on Mental Illness (www.[place].servando.org): 491.174.1519 or 176-238-7448.  Suicide Awareness Voices of Education (SAVE) (www.save.org): 621-655-UWTI (4707)  National Suicide Prevention Line (www.mentalhealthmn.org): 956-307-FJKK (2521)  Mental Health Consumer/Survivor Network of [place] (www.mhcsn.net): 332.881.1962 or 181-396-3355  Mental Health Association of [place] (www.mentalhealth.org): 278.912.6271 or 679-211-6687  [facility] Crisis (COPE) Response - Adult 545 629-8613  Text 4 Life: txt \"LIFE\" to 92536 for immediate support and crisis intervention    General Medication Instructions:   See your medication sheet(s) for instructions.   Take all medicines as directed.  Make no changes unless your doctor suggests them.   Go to all your doctor visits.  Be sure to have all your required lab tests. This way, your medicines can be refilled on time.  Do not use any drugs not prescribed by your doctor.  Avoid alcohol.    Advance Directives:   Scanned document on file with [facility]? No scanned doc  Is document scanned? Pt unable to confirm  Honoring Choices Your Rights Handout:    Was more information offered?      The Treatment team has appreciated the opportunity to work with you. If you have any questions or concerns about your recent admission, you can contact the unit which can receive your call 24 hours a day, 7 days a week. They will be able to get in touch with " a Provider if needed. The unit number is [phone]. Our Fax number: [phone]